# Patient Record
Sex: FEMALE | Race: WHITE | NOT HISPANIC OR LATINO | URBAN - METROPOLITAN AREA
[De-identification: names, ages, dates, MRNs, and addresses within clinical notes are randomized per-mention and may not be internally consistent; named-entity substitution may affect disease eponyms.]

---

## 2018-11-06 ENCOUNTER — CONVERSION ENCOUNTER (OUTPATIENT)
Dept: FAMILY MEDICINE CLINIC | Facility: CLINIC | Age: 63
End: 2018-11-06

## 2018-11-06 ENCOUNTER — OFFICE VISIT CONVERTED (OUTPATIENT)
Dept: FAMILY MEDICINE CLINIC | Facility: CLINIC | Age: 63
End: 2018-11-06
Attending: PHYSICIAN ASSISTANT

## 2019-05-07 ENCOUNTER — OFFICE VISIT CONVERTED (OUTPATIENT)
Dept: FAMILY MEDICINE CLINIC | Facility: CLINIC | Age: 64
End: 2019-05-07
Attending: PHYSICIAN ASSISTANT

## 2019-05-08 ENCOUNTER — HOSPITAL ENCOUNTER (OUTPATIENT)
Dept: LAB | Facility: HOSPITAL | Age: 64
Discharge: HOME | End: 2019-05-08
Attending: PHYSICIAN ASSISTANT

## 2019-05-08 LAB
ALBUMIN SERPL-MCNC: 4.3 G/DL (ref 3.5–5)
ALBUMIN/GLOB SERPL: 1.5 {RATIO} (ref 1.4–2.6)
ALP SERPL-CCNC: 86 U/L (ref 43–160)
ALT SERPL-CCNC: 20 U/L (ref 10–40)
ANION GAP SERPL CALC-SCNC: 21 MMOL/L (ref 8–19)
AST SERPL-CCNC: 19 U/L (ref 15–50)
BILIRUB SERPL-MCNC: 0.36 MG/DL (ref 0.2–1.3)
BUN SERPL-MCNC: 23 MG/DL (ref 5–25)
BUN/CREAT SERPL: 27 {RATIO} (ref 6–20)
CALCIUM SERPL-MCNC: 9.5 MG/DL (ref 8.7–10.4)
CHLORIDE SERPL-SCNC: 104 MMOL/L (ref 99–111)
CHOLEST SERPL-MCNC: 248 MG/DL (ref 107–200)
CHOLEST/HDLC SERPL: 4.3 {RATIO} (ref 3–6)
CONV CO2: 23 MMOL/L (ref 22–32)
CONV TOTAL PROTEIN: 7.2 G/DL (ref 6.3–8.2)
CREAT UR-MCNC: 0.85 MG/DL (ref 0.5–0.9)
GFR SERPLBLD BASED ON 1.73 SQ M-ARVRAT: >60 ML/MIN/{1.73_M2}
GLOBULIN UR ELPH-MCNC: 2.9 G/DL (ref 2–3.5)
GLUCOSE SERPL-MCNC: 116 MG/DL (ref 65–99)
HDLC SERPL-MCNC: 58 MG/DL (ref 40–60)
LDLC SERPL CALC-MCNC: 163 MG/DL (ref 70–100)
OSMOLALITY SERPL CALC.SUM OF ELEC: 301 MOSM/KG (ref 273–304)
POTASSIUM SERPL-SCNC: 4.5 MMOL/L (ref 3.5–5.3)
SODIUM SERPL-SCNC: 143 MMOL/L (ref 135–147)
T4 FREE SERPL-MCNC: 1 NG/DL (ref 0.9–1.8)
TRIGL SERPL-MCNC: 134 MG/DL (ref 40–150)
TSH SERPL-ACNC: 1.47 M[IU]/L (ref 0.27–4.2)
VLDLC SERPL-MCNC: 27 MG/DL (ref 5–37)

## 2019-06-27 ENCOUNTER — OFFICE VISIT CONVERTED (OUTPATIENT)
Dept: FAMILY MEDICINE CLINIC | Facility: CLINIC | Age: 64
End: 2019-06-27
Attending: PHYSICIAN ASSISTANT

## 2019-06-27 ENCOUNTER — CONVERSION ENCOUNTER (OUTPATIENT)
Dept: FAMILY MEDICINE CLINIC | Facility: CLINIC | Age: 64
End: 2019-06-27

## 2019-08-14 ENCOUNTER — HOSPITAL ENCOUNTER (OUTPATIENT)
Dept: GENERAL RADIOLOGY | Facility: HOSPITAL | Age: 64
Discharge: HOME | End: 2019-08-14
Attending: PHYSICIAN ASSISTANT

## 2019-09-24 ENCOUNTER — HOSPITAL ENCOUNTER (OUTPATIENT)
Dept: GENERAL RADIOLOGY | Facility: HOSPITAL | Age: 64
Discharge: HOME | End: 2019-09-24
Attending: PHYSICIAN ASSISTANT

## 2019-10-28 ENCOUNTER — HOSPITAL ENCOUNTER (OUTPATIENT)
Dept: LAB | Facility: HOSPITAL | Age: 64
Discharge: HOME | End: 2019-10-28
Attending: PHYSICIAN ASSISTANT

## 2019-10-28 LAB
25(OH)D3 SERPL-MCNC: 34.9 NG/ML (ref 30–100)
ALBUMIN SERPL-MCNC: 4.3 G/DL (ref 3.5–5)
ALBUMIN/GLOB SERPL: 1.6 {RATIO} (ref 1.4–2.6)
ALP SERPL-CCNC: 76 U/L (ref 43–160)
ALT SERPL-CCNC: 19 U/L (ref 10–40)
ANION GAP SERPL CALC-SCNC: 19 MMOL/L (ref 8–19)
APPEARANCE UR: CLEAR
AST SERPL-CCNC: 21 U/L (ref 15–50)
BASOPHILS # BLD AUTO: 0.05 10*3/UL (ref 0–0.2)
BASOPHILS NFR BLD AUTO: 0.9 % (ref 0–3)
BILIRUB SERPL-MCNC: 0.37 MG/DL (ref 0.2–1.3)
BILIRUB UR QL: NEGATIVE
BUN SERPL-MCNC: 16 MG/DL (ref 5–25)
BUN/CREAT SERPL: 22 {RATIO} (ref 6–20)
CALCIUM SERPL-MCNC: 9.6 MG/DL (ref 8.7–10.4)
CHLORIDE SERPL-SCNC: 103 MMOL/L (ref 99–111)
CHOLEST SERPL-MCNC: 243 MG/DL (ref 107–200)
CHOLEST/HDLC SERPL: 4.9 {RATIO} (ref 3–6)
COLOR UR: YELLOW
CONV ABS IMM GRAN: 0.01 10*3/UL (ref 0–0.2)
CONV CO2: 24 MMOL/L (ref 22–32)
CONV COLLECTION SOURCE (UA): NORMAL
CONV IMMATURE GRAN: 0.2 % (ref 0–1.8)
CONV TOTAL PROTEIN: 7 G/DL (ref 6.3–8.2)
CONV UROBILINOGEN IN URINE BY AUTOMATED TEST STRIP: 1 {EHRLICHU}/DL (ref 0.1–1)
CREAT UR-MCNC: 0.73 MG/DL (ref 0.5–0.9)
DEPRECATED RDW RBC AUTO: 45.1 FL (ref 36.4–46.3)
EOSINOPHIL # BLD AUTO: 0.13 10*3/UL (ref 0–0.7)
EOSINOPHIL # BLD AUTO: 2.4 % (ref 0–7)
ERYTHROCYTE [DISTWIDTH] IN BLOOD BY AUTOMATED COUNT: 13.2 % (ref 11.7–14.4)
EST. AVERAGE GLUCOSE BLD GHB EST-MCNC: 134 MG/DL
GFR SERPLBLD BASED ON 1.73 SQ M-ARVRAT: >60 ML/MIN/{1.73_M2}
GLOBULIN UR ELPH-MCNC: 2.7 G/DL (ref 2–3.5)
GLUCOSE SERPL-MCNC: 107 MG/DL (ref 65–99)
GLUCOSE UR QL: NEGATIVE MG/DL
HBA1C MFR BLD: 6.3 % (ref 3.5–5.7)
HCT VFR BLD AUTO: 38.5 % (ref 37–47)
HDLC SERPL-MCNC: 50 MG/DL (ref 40–60)
HGB BLD-MCNC: 12.3 G/DL (ref 12–16)
HGB UR QL STRIP: NEGATIVE
KETONES UR QL STRIP: NEGATIVE MG/DL
LDLC SERPL CALC-MCNC: 160 MG/DL (ref 70–100)
LEUKOCYTE ESTERASE UR QL STRIP: NEGATIVE
LYMPHOCYTES # BLD AUTO: 2.42 10*3/UL (ref 1–5)
LYMPHOCYTES NFR BLD AUTO: 44.8 % (ref 20–45)
MCH RBC QN AUTO: 29.7 PG (ref 27–31)
MCHC RBC AUTO-ENTMCNC: 31.9 G/DL (ref 33–37)
MCV RBC AUTO: 93 FL (ref 81–99)
MONOCYTES # BLD AUTO: 0.46 10*3/UL (ref 0.2–1.2)
MONOCYTES NFR BLD AUTO: 8.5 % (ref 3–10)
NEUTROPHILS # BLD AUTO: 2.33 10*3/UL (ref 2–8)
NEUTROPHILS NFR BLD AUTO: 43.2 % (ref 30–85)
NITRITE UR QL STRIP: NEGATIVE
NRBC CBCN: 0 % (ref 0–0.7)
OSMOLALITY SERPL CALC.SUM OF ELEC: 296 MOSM/KG (ref 273–304)
PH UR STRIP.AUTO: 5 [PH] (ref 5–8)
PLATELET # BLD AUTO: 256 10*3/UL (ref 130–400)
PMV BLD AUTO: 10 FL (ref 9.4–12.3)
POTASSIUM SERPL-SCNC: 4.3 MMOL/L (ref 3.5–5.3)
PROT UR QL: NEGATIVE MG/DL
RBC # BLD AUTO: 4.14 10*6/UL (ref 4.2–5.4)
SODIUM SERPL-SCNC: 142 MMOL/L (ref 135–147)
SP GR UR: 1.02 (ref 1–1.03)
T4 FREE SERPL-MCNC: 0.9 NG/DL (ref 0.9–1.8)
TRIGL SERPL-MCNC: 164 MG/DL (ref 40–150)
TSH SERPL-ACNC: 1.87 M[IU]/L (ref 0.27–4.2)
VLDLC SERPL-MCNC: 33 MG/DL (ref 5–37)
WBC # BLD AUTO: 5.4 10*3/UL (ref 4.8–10.8)

## 2019-11-11 ENCOUNTER — OFFICE VISIT CONVERTED (OUTPATIENT)
Dept: FAMILY MEDICINE CLINIC | Facility: CLINIC | Age: 64
End: 2019-11-11
Attending: PHYSICIAN ASSISTANT

## 2019-11-11 ENCOUNTER — HOSPITAL ENCOUNTER (OUTPATIENT)
Dept: GENERAL RADIOLOGY | Facility: HOSPITAL | Age: 64
Discharge: HOME | End: 2019-11-11
Attending: PHYSICIAN ASSISTANT

## 2019-12-06 ENCOUNTER — OFFICE VISIT CONVERTED (OUTPATIENT)
Dept: FAMILY MEDICINE CLINIC | Facility: CLINIC | Age: 64
End: 2019-12-06
Attending: PHYSICIAN ASSISTANT

## 2020-04-21 ENCOUNTER — TELEMEDICINE CONVERTED (OUTPATIENT)
Dept: FAMILY MEDICINE CLINIC | Facility: CLINIC | Age: 65
End: 2020-04-21
Attending: PHYSICIAN ASSISTANT

## 2021-05-12 NOTE — PROGRESS NOTES
Progress Note      Patient Name: Maria Esther Crespo   Patient ID: [id number]   Sex: Female   YOB: 1955    Primary Care Provider: Sherman Katz PA-C   Referring Provider: Sherman Katz PA-C    Visit Date: April 21, 2020    Provider: Sherman Katz PA-C   Location: [facility]   Location Address: [address] [place] [address], [address]  [place], [place]  [id number]   Location Phone: [phone]          Chief Complaint  · both feet burning      History Of Present Illness  Video Conferencing Visit  Maria Esther Crespo is a 64 year old /White female who is presenting for evaluation via video conferencing. Verbal consent obtained before beginning visit.   The following staff were present during this visit: Amber Torres CMA/Sherman Katz PA-C   Maria Esther Crespo is a 64 year old /White female who presents for evaluation and treatment of: both feet burning.      pt presents today for both feet burning with some numbness. pt stated her big toe joints are swollen, this has been going on for about a week.  pt stated she has been treated for gout before.    Pt denies any increased etoh consumption but she has been eating more processed food since being at home during the viral outbreak.           Past Medical History  Disease Name Date Onset Notes   Ankle fracture --  --    Cervical spine pain --  --    Cervicalgia 02/28/2017 --    Hepatitis C Screening 08/29/2017 [facility]   Hyperglycemia --  --    Hyperlipidemia 11/06/2018 --    Lumbago 01/31/2017 --    Muscle strain 02/28/2017 --    Pap smear for cervical cancer screening 01/24/2017 Negative   Paresthesia 01/31/2017 feet   Screening Mammogram 04/06/2018 ; 09/27/2019 [facility] - Normal, repeat in a year ; 2019 - normal, repeat in one year   Thoracic back pain 01/31/2017 --          Past Surgical History  Procedure Name Date Notes   Carpal tunnel release --  right   Rotator Cuff repair --  right         Medication List  Name Date Started Instructions   Celebrex 100 mg  oral capsule 2019 take 1 capsule (100 mg) by oral route 2 times per day for 30 days   cyclobenzaprine 10 mg oral tablet 2019 take 1 tablet (10 mg) by oral route 3 times per day PRN spasms/pain   rosuvastatin 40 mg oral tablet 2019 take 1 tablet (40 mg) by oral route once daily         Allergy List  Allergen Name Date Reaction Notes   NO KNOWN DRUG ALLERGIES --  --  --        Allergies Reconciled  Family Medical History  Disease Name Relative/Age Notes   *No Known Family History  --          Reproductive History  Menstrual   Age Menarche: 13   Pregnancy Summary   Total Pregnancies: 0 Full Term: 0 Premature: 0   Ab Induced: 0 Ab Spontaneous: 0 Ectopics: 0   Multiples: 0 Livin         Social History  Finding Status Start/Stop Quantity Notes   Alcohol Never --/-- --  --     --  --/-- --  --    No known infection risk --  --/-- --  --    Tobacco Never --/-- --  --          Immunizations  NameDate Admin Mfg Trade Name Lot Number Route Inj VIS Given VIS Publication   Hepatitis [id number] SKB HAVRIX-ADULT 2AD47 IM LD 2019    Comments:          Review of Systems  · Constitutional  o Denies  o : fever, fatigue, weight loss, weight gain  · Cardiovascular  o Denies  o : lower extremity edema, claudication, chest pressure, palpitations  · Respiratory  o Denies  o : shortness of breath, wheezing, cough, hemoptysis, dyspnea on exertion  · Gastrointestinal  o Denies  o : nausea, vomiting, diarrhea, constipation, abdominal pain      Physical Examination  · Constitutional  o Appearance  o : well-nourished, no acute distress  · Respiratory  o Respiratory Effort  o : breathing comfortably, no cough  · Skin and Subcutaneous Tissue  o General Inspection  o : no visible rash, no lesions  · Neurologic  o Mental Status Examination  o :   § Orientation  § : grossly oriented to person, place and time, no facial  droop          Assessment  · Hyperlipidemia     272.4/E78.5  · Gout     274.9/M10.9      Plan  · Orders  o ACO-39: Current medications updated and reviewed () - - 04/21/2020  · Medications  o indomethacin 50 mg oral capsule   SIG: take 1 capsule (50 mg) by oral route 3 times per day with food   DISP: (21) capsules with 0 refills  Prescribed on 04/21/2020     o Augmentin 875-125 mg oral tablet   SIG: take 1 tablet by oral route every 12 hours for 10 days   DISP: (20) tablets with 0 refills  Discontinued on 04/21/2020     o Effexor XR 37.5 mg oral capsule,extended release 24hr   SIG: take 1 capsule (37.5 mg) by oral route once daily with food for hot flashes   DISP: (90) capsules with 1 refills  Discontinued on 04/21/2020     o Medications have been Reconciled  o Transition of Care or Provider Policy  · Instructions  o Patient was educated and given low cholesterol diet information.  o Recommended exercise program to assist with cholesterol, weight loss and overall health improvement.  o Advised that cheeses and other sources of dairy fats, animal fats, fast food, and the extras (candy, pastries, pies, doughnuts and cookies) all contain LDL raising nutrients. Advised to increase fruits, vegetables, whole grains, and to monitor portion sizes.   o Take all medications as prescribed/directed.  o Patient instructed/educated on their diet and exercise program.  o Patient was educated/instructed on their diagnosis, treatment and medications prior to discharge from the clinic today.  o Low purine diet  · Disposition  o Call or Return if symptoms worsen or persist.  o Care Transition            Electronically Signed by: Sherman Katz PA-C -Author on April 21, 2020 09:16:08 AM

## 2021-05-15 VITALS
WEIGHT: 188.25 LBS | DIASTOLIC BLOOD PRESSURE: 67 MMHG | BODY MASS INDEX: 32.14 KG/M2 | HEART RATE: 50 BPM | HEIGHT: 64 IN | OXYGEN SATURATION: 96 % | SYSTOLIC BLOOD PRESSURE: 122 MMHG

## 2021-05-15 VITALS
SYSTOLIC BLOOD PRESSURE: 118 MMHG | OXYGEN SATURATION: 95 % | TEMPERATURE: 98 F | DIASTOLIC BLOOD PRESSURE: 60 MMHG | HEIGHT: 64 IN | BODY MASS INDEX: 33.63 KG/M2 | HEART RATE: 57 BPM | WEIGHT: 197 LBS

## 2021-05-15 VITALS
HEIGHT: 64 IN | SYSTOLIC BLOOD PRESSURE: 136 MMHG | OXYGEN SATURATION: 99 % | BODY MASS INDEX: 32.78 KG/M2 | HEART RATE: 54 BPM | DIASTOLIC BLOOD PRESSURE: 64 MMHG | WEIGHT: 192 LBS

## 2021-05-15 VITALS
WEIGHT: 193 LBS | HEIGHT: 64 IN | BODY MASS INDEX: 32.95 KG/M2 | HEART RATE: 56 BPM | SYSTOLIC BLOOD PRESSURE: 116 MMHG | DIASTOLIC BLOOD PRESSURE: 62 MMHG | OXYGEN SATURATION: 95 %

## 2021-05-16 VITALS
OXYGEN SATURATION: 94 % | HEIGHT: 65 IN | WEIGHT: 190.12 LBS | BODY MASS INDEX: 31.68 KG/M2 | DIASTOLIC BLOOD PRESSURE: 60 MMHG | HEART RATE: 56 BPM | SYSTOLIC BLOOD PRESSURE: 120 MMHG

## 2023-07-22 ENCOUNTER — APPOINTMENT (OUTPATIENT)
Dept: CT IMAGING | Facility: HOSPITAL | Age: 68
End: 2023-07-22
Payer: MEDICARE

## 2023-07-22 ENCOUNTER — HOSPITAL ENCOUNTER (EMERGENCY)
Facility: HOSPITAL | Age: 68
Discharge: HOME | End: 2023-07-22
Attending: EMERGENCY MEDICINE | Admitting: EMERGENCY MEDICINE
Payer: MEDICARE

## 2023-07-22 VITALS
HEART RATE: 43 BPM | HEIGHT: 65 IN | BODY MASS INDEX: 31.04 KG/M2 | RESPIRATION RATE: 18 BRPM | OXYGEN SATURATION: 97 % | DIASTOLIC BLOOD PRESSURE: 65 MMHG | WEIGHT: 186.29 LBS | SYSTOLIC BLOOD PRESSURE: 124 MMHG | TEMPERATURE: 98.3 F

## 2023-07-22 DIAGNOSIS — S16.1XXA CERVICAL STRAIN, ACUTE, INITIAL ENCOUNTER: Primary | ICD-10-CM

## 2023-07-22 DIAGNOSIS — M54.12 LEFT CERVICAL RADICULOPATHY: ICD-10-CM

## 2023-07-22 LAB
ANION GAP SERPL CALCULATED.3IONS-SCNC: 11.3 MMOL/L (ref 5–15)
BASOPHILS # BLD AUTO: 0.07 10*3/MM3 (ref 0–0.2)
BASOPHILS NFR BLD AUTO: 0.9 % (ref 0–1.5)
BUN SERPL-MCNC: 16 MG/DL (ref 8–23)
BUN/CREAT SERPL: 22.5 (ref 7–25)
CALCIUM SPEC-SCNC: 9.8 MG/DL (ref 8.6–10.5)
CHLORIDE SERPL-SCNC: 99 MMOL/L (ref 98–107)
CO2 SERPL-SCNC: 24.7 MMOL/L (ref 22–29)
CREAT SERPL-MCNC: 0.71 MG/DL (ref 0.57–1)
DEPRECATED RDW RBC AUTO: 40.9 FL (ref 37–54)
EGFRCR SERPLBLD CKD-EPI 2021: 93.9 ML/MIN/1.73
EOSINOPHIL # BLD AUTO: 0.13 10*3/MM3 (ref 0–0.4)
EOSINOPHIL NFR BLD AUTO: 1.6 % (ref 0.3–6.2)
ERYTHROCYTE [DISTWIDTH] IN BLOOD BY AUTOMATED COUNT: 12.8 % (ref 12.3–15.4)
GLUCOSE SERPL-MCNC: 137 MG/DL (ref 65–99)
HCT VFR BLD AUTO: 40.4 % (ref 34–46.6)
HGB BLD-MCNC: 13.8 G/DL (ref 12–15.9)
IMM GRANULOCYTES # BLD AUTO: 0.03 10*3/MM3 (ref 0–0.05)
IMM GRANULOCYTES NFR BLD AUTO: 0.4 % (ref 0–0.5)
LYMPHOCYTES # BLD AUTO: 2.53 10*3/MM3 (ref 0.7–3.1)
LYMPHOCYTES NFR BLD AUTO: 31.1 % (ref 19.6–45.3)
MCH RBC QN AUTO: 29.9 PG (ref 26.6–33)
MCHC RBC AUTO-ENTMCNC: 34.2 G/DL (ref 31.5–35.7)
MCV RBC AUTO: 87.6 FL (ref 79–97)
MONOCYTES # BLD AUTO: 0.63 10*3/MM3 (ref 0.1–0.9)
MONOCYTES NFR BLD AUTO: 7.7 % (ref 5–12)
NEUTROPHILS NFR BLD AUTO: 4.75 10*3/MM3 (ref 1.7–7)
NEUTROPHILS NFR BLD AUTO: 58.3 % (ref 42.7–76)
NRBC BLD AUTO-RTO: 0 /100 WBC (ref 0–0.2)
PLATELET # BLD AUTO: 299 10*3/MM3 (ref 140–450)
PMV BLD AUTO: 9.5 FL (ref 6–12)
POTASSIUM SERPL-SCNC: 4.2 MMOL/L (ref 3.5–5.2)
RBC # BLD AUTO: 4.61 10*6/MM3 (ref 3.77–5.28)
SODIUM SERPL-SCNC: 135 MMOL/L (ref 136–145)
WBC NRBC COR # BLD: 8.14 10*3/MM3 (ref 3.4–10.8)

## 2023-07-22 PROCEDURE — 25010000002 ONDANSETRON PER 1 MG: Performed by: EMERGENCY MEDICINE

## 2023-07-22 PROCEDURE — 80048 BASIC METABOLIC PNL TOTAL CA: CPT | Performed by: EMERGENCY MEDICINE

## 2023-07-22 PROCEDURE — 25010000002 KETOROLAC TROMETHAMINE PER 15 MG: Performed by: EMERGENCY MEDICINE

## 2023-07-22 PROCEDURE — 96374 THER/PROPH/DIAG INJ IV PUSH: CPT

## 2023-07-22 PROCEDURE — 96375 TX/PRO/DX INJ NEW DRUG ADDON: CPT

## 2023-07-22 PROCEDURE — 96376 TX/PRO/DX INJ SAME DRUG ADON: CPT

## 2023-07-22 PROCEDURE — 70498 CT ANGIOGRAPHY NECK: CPT

## 2023-07-22 PROCEDURE — 85025 COMPLETE CBC W/AUTO DIFF WBC: CPT | Performed by: EMERGENCY MEDICINE

## 2023-07-22 PROCEDURE — 25010000002 HYDROMORPHONE 1 MG/ML SOLUTION: Performed by: EMERGENCY MEDICINE

## 2023-07-22 PROCEDURE — 99284 EMERGENCY DEPT VISIT MOD MDM: CPT

## 2023-07-22 PROCEDURE — 25510000001 IOPAMIDOL PER 1 ML: Performed by: EMERGENCY MEDICINE

## 2023-07-22 RX ORDER — ONDANSETRON 2 MG/ML
4 INJECTION INTRAMUSCULAR; INTRAVENOUS ONCE
Status: DISCONTINUED | OUTPATIENT
Start: 2023-07-22 | End: 2023-07-22

## 2023-07-22 RX ORDER — METHOCARBAMOL 500 MG/1
500 TABLET, FILM COATED ORAL 4 TIMES DAILY PRN
Qty: 15 TABLET | Refills: 0 | Status: SHIPPED | OUTPATIENT
Start: 2023-07-22

## 2023-07-22 RX ORDER — IBUPROFEN 800 MG/1
800 TABLET ORAL EVERY 8 HOURS PRN
Qty: 20 TABLET | Refills: 0 | Status: SHIPPED | OUTPATIENT
Start: 2023-07-22

## 2023-07-22 RX ORDER — KETOROLAC TROMETHAMINE 15 MG/ML
15 INJECTION, SOLUTION INTRAMUSCULAR; INTRAVENOUS ONCE
Status: COMPLETED | OUTPATIENT
Start: 2023-07-22 | End: 2023-07-22

## 2023-07-22 RX ORDER — ONDANSETRON 2 MG/ML
4 INJECTION INTRAMUSCULAR; INTRAVENOUS ONCE
Status: COMPLETED | OUTPATIENT
Start: 2023-07-22 | End: 2023-07-22

## 2023-07-22 RX ORDER — HYDROCODONE BITARTRATE AND ACETAMINOPHEN 5; 325 MG/1; MG/1
1 TABLET ORAL ONCE
Status: DISCONTINUED | OUTPATIENT
Start: 2023-07-22 | End: 2023-07-22

## 2023-07-22 RX ORDER — HYDROCODONE BITARTRATE AND ACETAMINOPHEN 5; 325 MG/1; MG/1
1 TABLET ORAL EVERY 6 HOURS PRN
Status: DISCONTINUED | OUTPATIENT
Start: 2023-07-22 | End: 2023-07-22 | Stop reason: HOSPADM

## 2023-07-22 RX ORDER — HYDROCODONE BITARTRATE AND ACETAMINOPHEN 5; 325 MG/1; MG/1
1 TABLET ORAL EVERY 6 HOURS PRN
Qty: 12 TABLET | Refills: 0 | Status: SHIPPED | OUTPATIENT
Start: 2023-07-22

## 2023-07-22 RX ORDER — ONDANSETRON 4 MG/1
4 TABLET, ORALLY DISINTEGRATING ORAL EVERY 6 HOURS PRN
Qty: 10 TABLET | Refills: 0 | Status: SHIPPED | OUTPATIENT
Start: 2023-07-22

## 2023-07-22 RX ORDER — SODIUM CHLORIDE 0.9 % (FLUSH) 0.9 %
10 SYRINGE (ML) INJECTION AS NEEDED
Status: DISCONTINUED | OUTPATIENT
Start: 2023-07-22 | End: 2023-07-22 | Stop reason: HOSPADM

## 2023-07-22 RX ADMIN — KETOROLAC TROMETHAMINE 15 MG: 15 INJECTION, SOLUTION INTRAMUSCULAR; INTRAVENOUS at 09:16

## 2023-07-22 RX ADMIN — HYDROMORPHONE HYDROCHLORIDE 1 MG: 1 INJECTION, SOLUTION INTRAMUSCULAR; INTRAVENOUS; SUBCUTANEOUS at 09:16

## 2023-07-22 RX ADMIN — IOPAMIDOL 100 ML: 755 INJECTION, SOLUTION INTRAVENOUS at 11:26

## 2023-07-22 RX ADMIN — ONDANSETRON 4 MG: 2 INJECTION INTRAMUSCULAR; INTRAVENOUS at 09:16

## 2023-07-22 RX ADMIN — HYDROCODONE BITARTRATE AND ACETAMINOPHEN 1 TABLET: 5; 325 TABLET ORAL at 13:27

## 2023-07-22 RX ADMIN — ONDANSETRON 4 MG: 2 INJECTION INTRAMUSCULAR; INTRAVENOUS at 13:27

## 2023-07-22 NOTE — ED PROVIDER NOTES
"Time: 9:13 AM EDT  Date of encounter:  2023  Independent Historian/Clinical History and Information was obtained by:   Patient    History is limited by: N/A    Chief Complaint: Severe posterior neck pain after chiropractic adjustment        History of Present Illness:  Patient is a 67 y.o. year old female who presents to the emergency department for evaluation of severe posterior neck pain all week after chiropractic adjustment.    She saw the chiropractor last week for some tingling discomfort going down her left arm and they adjusted her neck, and she afterwards had severe neck pain.    She went back the next day in the adjusted her again.    She has been having severe posterior neck pain a bit more on the left posterior aspect of the neck, somewhat worse with movement.    She is not really complaining of much pain down the left arm.      No fevers reported.  She has not taken any pain medication today.    HPI    Patient Care Team  Primary Care Provider: Provider, No Known    Past Medical History:     No Known Allergies  History reviewed. No pertinent past medical history.  Past Surgical History:   Procedure Laterality Date    BILATERAL BREAST REDUCTION Bilateral      SECTION       History reviewed. No pertinent family history.    Home Medications:  Prior to Admission medications    Not on File        Social History:   Social History     Tobacco Use    Smoking status: Never   Substance Use Topics    Alcohol use: Never    Drug use: Never         Review of Systems:  Review of Systems   I performed a 10 point review of systems which was all negative, except for the positives found in the HPI above.  Physical Exam:  /65   Pulse (!) 43   Temp 98.3 °F (36.8 °C) (Oral)   Resp 18   Ht 165.1 cm (65\")   Wt 84.5 kg (186 lb 4.6 oz)   SpO2 97%   BMI 31.00 kg/m²     Physical Exam   General: Awake alert and in moderate distress due to neck pain    HEENT: Head normocephalic atraumatic, eyes PERRLA EOMI, " nose normal, oropharynx normal.    Neck: Somewhat decreased range of motion from side to side, no meningismus, no lymphadenopathy summary: No reproducible C-spine tenderness to palpation    Heart: Regular rate and rhythm, no murmurs or rubs, 2+ radial pulses bilaterally    Lungs: Clear to auscultation bilaterally without wheezes or crackles, no respiratory distress    Abdomen: Soft, nontender, nondistended, no rebound or guarding    Skin: Warm, dry, no rash    Musculoskeletal: Normal range of motion, no lower extremity edema    Neurologic: Oriented x3, no motor deficits no sensory deficits    Psychiatric: Mood appears stable, no psychosis          Procedures:  Procedures      Medical Decision Making:      Comorbidities that affect care:    None    External Notes reviewed:    None      The following orders were placed and all results were independently analyzed by me:  Orders Placed This Encounter   Procedures    CT Angiogram Neck    Basic Metabolic Panel    CBC Auto Differential    Pulse Oximetry, Continuous    Insert Peripheral IV    CBC & Differential       Medications Given in the Emergency Department:  Medications   sodium chloride 0.9 % flush 10 mL (has no administration in time range)   HYDROcodone-acetaminophen (NORCO) 5-325 MG per tablet 1 tablet (1 tablet Oral Given 7/22/23 1327)   ketorolac (TORADOL) injection 15 mg (15 mg Intravenous Given 7/22/23 0916)   HYDROmorphone (DILAUDID) injection 1 mg (1 mg Intravenous Given 7/22/23 0916)   ondansetron (ZOFRAN) injection 4 mg (4 mg Intravenous Given 7/22/23 0916)   iopamidol (ISOVUE-370) 76 % injection 100 mL (100 mL Intravenous Given 7/22/23 1126)   ondansetron (ZOFRAN) injection 4 mg (4 mg Intravenous Given 7/22/23 1327)        ED Course:         Labs:    Lab Results (last 24 hours)       Procedure Component Value Units Date/Time    CBC & Differential [[id number]]  (Normal) Collected: 07/22/23 0915    Specimen: Blood Updated: 07/22/23 0932    Narrative:       The following orders were created for panel order CBC & Differential.  Procedure                               Abnormality         Status                     ---------                               -----------         ------                     CBC Auto Differential[[id number]]        Normal              Final result                 Please view results for these tests on the individual orders.    Basic Metabolic Panel [[id number]]  (Abnormal) Collected: 07/22/23 0915    Specimen: Blood Updated: 07/22/23 1021     Glucose 137 mg/dL      BUN 16 mg/dL      Creatinine 0.71 mg/dL      Sodium 135 mmol/L      Potassium 4.2 mmol/L      Chloride 99 mmol/L      CO2 24.7 mmol/L      Calcium 9.8 mg/dL      BUN/Creatinine Ratio 22.5     Anion Gap 11.3 mmol/L      eGFR 93.9 mL/min/1.73     Narrative:      GFR Normal >60  Chronic Kidney Disease <60  Kidney Failure <15      CBC Auto Differential [[id number]]  (Normal) Collected: 07/22/23 0915    Specimen: Blood Updated: 07/22/23 0932     WBC 8.14 10*3/mm3      RBC 4.61 10*6/mm3      Hemoglobin 13.8 g/dL      Hematocrit 40.4 %      MCV 87.6 fL      MCH 29.9 pg      MCHC 34.2 g/dL      RDW 12.8 %      RDW-SD 40.9 fl      MPV 9.5 fL      Platelets 299 10*3/mm3      Neutrophil % 58.3 %      Lymphocyte % 31.1 %      Monocyte % 7.7 %      Eosinophil % 1.6 %      Basophil % 0.9 %      Immature Grans % 0.4 %      Neutrophils, Absolute 4.75 10*3/mm3      Lymphocytes, Absolute 2.53 10*3/mm3      Monocytes, Absolute 0.63 10*3/mm3      Eosinophils, Absolute 0.13 10*3/mm3      Basophils, Absolute 0.07 10*3/mm3      Immature Grans, Absolute 0.03 10*3/mm3      nRBC 0.0 /100 WBC              Imaging:    CT Angiogram Neck    Result Date: 7/22/2023  PROCEDURE: CT ANGIOGRAM NECK  COMPARISON: None  INDICATIONS: Severe posterior neck pain all week ever since chiropractic adjustment  PROTOCOL:   Standard imaging protocol performed    RADIATION:   DLP: 636.6 mGy*cm   Automated exposure control was utilized  to minimize radiation dose. CONTRAST: 75 cc Isovue 370 I.V. LABS:   eGFR: >60 ml/min/1.73m2  TECHNIQUE: After obtaining the patient's consent, CT images of the neck were obtained without and with non-ionic intravenous contrast material. Multi-planar reformatted/3-D images were created to optimize visualization of vascular anatomy. Unless otherwise stated in this report, all vascular stenoses involving the internal carotid arteries reported for this examination are derived by dividing the lesion diameter by the diameter of the normal internal carotid artery more distally.  FINDINGS:  Vascular:  Visualized pulmonary arteries appear to enhance as expected on this 6 them ago arterial phase exam.  No acute abnormality is seen in the aortic arch.  There is motion artifact in the ascending aorta.  There is common origin of the left common carotid and right brachiocephalic arteries which is a normal variant.  Left subclavian artery appears patent.  There is some artifact at the origin of the right subclavian artery limiting assessment.  No definite high-grade brachiocephalic or subclavian stenosis is seen.  No definite high-grade stenosis is seen in the vertebral arteries.  The vertebral arteries appear to be grossly symmetric and patent.  No definite acute abnormality is seen on this exam.  The left common carotid artery appears patent.  There is mild atherosclerosis at the left carotid bulb.  No stenosis of greater than 50% is seen in the left internal carotid artery by NASCET criteria.  The external carotid artery appears patent.  The right common carotid artery origin is obscured by artifact.  Distal right common carotid artery appears patent.  External carotid artery appears patent.  There is minimal atherosclerosis at the right carotid bulb.  No definite stenosis of greater than 50% is seen by NASCET criteria.  Mild atherosclerosis is seen in the cavernous portions of the bilateral internal carotid arteries.  No  high-grade stenosis is seen.  No definite acute intracranial vascular abnormality is seen.  No other acute vascular abnormality is identified.  Nonvascular:  No definite suspicious infiltrate is seen in the visualized lung apices.  No definite acute abnormality is seen in the visualized intracranial structures.  Globes and orbits appear unremarkable.  Paranasal sinuses and mastoid air cells appear clear.  No definite acute cervical soft tissue abnormality is identified.  Thyroid gland appears grossly unremarkable.  Prevertebral soft tissue contour appears within normal limits.  No definite paraspinal soft tissue abnormality is seen.  There is mild kyphotic curvature of the cervical and upper thoracic spine.  No definite focal malalignment is seen in the visualized spine.  Vertebral body heights appear maintained.  No definite acute displaced fracture is seen.  There is suspected mild multilevel facet arthropathy.  There is multilevel disc degeneration with mild disc height loss in the lower cervical spine.  Disc osteophyte at C5-6 appears to cause mild-to-moderate canal and foraminal narrowing.  There is suspicion for a left paracentral/foraminal protrusion/extrusion at C4-5 which appears to cause mild-to-moderate left canal and possibly severe left foraminal narrowing.  There is also suspicion for left foraminal narrowing at C3-4 due to facet arthropathy and probable disc bulge/protrusion.        1. No definite findings of acute vascular abnormality. 2. Mild carotid artery atherosclerosis without definite stenosis of greater than 50% in the internal carotid arteries at this time. 3. No definite acute osseous cervical spine abnormality is identified. 4. There is suspicion for a left paracentral/foraminal protrusion/extrusion at C4-5 which appears to cause mild to moderate left canal narrowing and possibly severe left foraminal narrowing.  There is also suspicion for left foraminal narrowing at C3-4 and mild to  moderate canal and foraminal narrowing at C5-6.     RYAN MARIE MD       Electronically Signed and Approved By: RYAN MARIE MD on 7/22/2023 at 12:06                Differential Diagnosis and Discussion:    Neck Pain: The patient presents with neck pain. My differential diagnosis includes but is not limited to acute spinal epidural abscess, acute spinal epidural bleed, meningitis, musculoskeletal neck pain, spinal fracture, and osteoarthritis.     CT scan radiology impression was interpreted by me.    MDM     Amount and/or Complexity of Data Reviewed  Clinical lab tests: reviewed  Tests in the radiology section of CPT®: reviewed               This patient is a pleasant 67-year-old female that was having symptoms of left-sided cervical radiculopathy and saw her chiropractor and had her neck adjusted and afterwards had severe posterior neck pain all week.    She is still having severe pain and I am giving her some IV Dilaudid and Toradol for symptom relief and will reassess.    I am getting some imaging of her cervical spine to include imaging of the vertebral arteries given my concern for potential dissection, and we will get CTA of the neck with contrast to rule this out.    Otherwise if CTA of the neck come back negative, she would be safely discharged home to follow-up with PCP and I will prescribe some pain medications to alternate with muscle relaxant.    CTA came back negative for any vascular dissection, but it was noted that she has some neuroforaminal narrowing and left sided cervical radiculopathy and impingement probably causing her symptoms.    I am starting her on high-dose ibuprofen and some pain meds and muscle relaxants until she can follow-up with the spine clinic.            Patient Care Considerations:          Consultants/Shared Management Plan:        Social Determinants of Health:    Patient is independent, reliable, and has access to care.       Disposition and Care  Coordination:    Discharged: I considered escalation of care by admitting this patient for observation, however the patient has improved and is suitable and  stable for discharge.    I have explained the patient´s condition, diagnoses and treatment plan based on the information available to me at this time. I have answered questions and addressed any concerns. The patient has a good  understanding of the patient´s diagnosis, condition, and treatment plan as can be expected at this point. The vital signs have been stable. The patient´s condition is stable and appropriate for discharge from the emergency department.      The patient will pursue further outpatient evaluation with the primary care physician or other designated or consulting physician as outlined in the discharge instructions. They are agreeable to this plan of care and follow-up instructions have been explained in detail. The patient has received these instructions in written format and have expressed an understanding of the discharge instructions. The patient is aware that any significant change in condition or worsening of symptoms should prompt an immediate return to this or the closest emergency department or call to 911.  I have explained discharge medications and the need for follow up with the patient/caretakers. This was also printed in the discharge instructions. Patient was discharged with the following medications and follow up:      Medication List        New Prescriptions      HYDROcodone-acetaminophen 5-325 MG per tablet  Commonly known as: NORCO  Take 1 tablet by mouth Every 6 (Six) Hours As Needed for Severe Pain.     ibuprofen 800 MG tablet  Commonly known as: ADVIL,MOTRIN  Take 1 tablet by mouth Every 8 (Eight) Hours As Needed for Moderate Pain.     methocarbamol 500 MG tablet  Commonly known as: ROBAXIN  Take 1 tablet by mouth 4 (Four) Times a Day As Needed for Muscle Spasms.     ondansetron ODT 4 MG disintegrating tablet  Commonly  known as: ZOFRAN-ODT  Place 1 tablet on the tongue Every 6 (Six) Hours As Needed for Nausea or Vomiting.               Where to Get Your Medications        These medications were sent to [id number] - [place], [place] - [address] AT [facility] [phone]  - [phone]  [address] [place] [address]      Phone: [phone]   HYDROcodone-acetaminophen 5-325 MG per tablet  ibuprofen 800 MG tablet  methocarbamol 500 MG tablet  ondansetron ODT 4 MG disintegrating tablet      Aron Farooq MD  [address] [place] [id number] [phone]    Call in 1 day  for a follow-up appointment       Final diagnoses:   Cervical strain, acute, initial encounter   Left cervical radiculopathy        ED Disposition       ED Disposition   Discharge    Condition   Stable    Comment   --               This medical record created using voice recognition software.             Erlin Richards MD  07/22/23 4910

## 2023-07-22 NOTE — DISCHARGE INSTRUCTIONS
No vascular dissection or injury was seen from your chiropractic adjustment, but more of just pinched nerves seen.      It looks like you have some bulging disks and neuroforaminal narrowing of the cervical spine causing some of the left-sided nerves to get pinched that run down the left shoulder and left arm to the hand.    You can take some anti-inflammatories for pain and you can alternate the pain medicine and with muscle relaxant as needed.    You should call the spine surgery clinic for a follow-up appointment to see if anything else needs to be done.

## 2023-07-22 NOTE — Clinical Note
[facility] EMERGENCY ROOM  [address] [place] [address]  Phone: [phone]    Maria Esther Crespo was seen and treated in our emergency department on 7/22/2023.  She may return to work on 07/24/2023.         Thank you for choosing [facility].    Erlin Richards MD

## 2023-07-27 ENCOUNTER — OFFICE VISIT (OUTPATIENT)
Dept: NEUROSURGERY | Facility: CLINIC | Age: 68
End: 2023-07-27
Payer: MEDICARE

## 2023-07-27 VITALS
SYSTOLIC BLOOD PRESSURE: 122 MMHG | WEIGHT: 185 LBS | BODY MASS INDEX: 30.82 KG/M2 | HEIGHT: 65 IN | DIASTOLIC BLOOD PRESSURE: 72 MMHG | HEART RATE: 56 BPM

## 2023-07-27 DIAGNOSIS — M48.02 FORAMINAL STENOSIS OF CERVICAL REGION: ICD-10-CM

## 2023-07-27 DIAGNOSIS — M54.12 RIGHT CERVICAL RADICULOPATHY: ICD-10-CM

## 2023-07-27 DIAGNOSIS — M50.30 DDD (DEGENERATIVE DISC DISEASE), CERVICAL: Primary | ICD-10-CM

## 2023-07-27 DIAGNOSIS — M54.2 CERVICALGIA: ICD-10-CM

## 2023-07-27 RX ORDER — GABAPENTIN 300 MG/1
300 CAPSULE ORAL 3 TIMES DAILY
Qty: 90 CAPSULE | Refills: 1 | Status: SHIPPED | OUTPATIENT
Start: 2023-07-27

## 2023-07-27 NOTE — PROGRESS NOTES
"Chief Complaint  Neck Pain, Arm Pain (Right arm to fingers), and Tingling (Right forearm to 2nd-4th digits of right hand )    Subjective          Maria Esther Crespo who is a 67 y.o. year old female who presents to [facility] for Evaluation of the Spine.     The patient complains of pain located in the Cervical Spine.  Patients states the pain has been present for 1 week.  The pain came on acutely.  The pain scaled level is 10.  The pain does radiate. Dermatomes are located on left Cervical at: the middle 3 fingers.  The pain is constant and waxing/waning and described as dull.  The pain is worse at no particular time of day. Patient states Pain Medication makes the pain better.  Patient states  any manual activity makes the pain worse.    Associated Symptoms Include: Numbness and Tingling in the left arm. She reports subjective weakness in the left arm.  Conservative Interventions Include: Pain Medications that were somewhat effective., NSAIDs that were ineffective., and Muscle Relaxants that were ineffective. Chiropractor which was ineffective.    Was this the result of an injury or accident? : No    History of Previous Spinal Surgery?: No     reports that she has never smoked. She does not have any smokeless tobacco history on file.    Review of Systems   Musculoskeletal:  Positive for neck pain.      Objective   Vital Signs:   /72   Pulse 56   Ht 165.1 cm (65\")   Wt 83.9 kg (185 lb)   BMI 30.79 kg/m²       Physical Exam  Constitutional:       Appearance: Normal appearance. She is obese.   Neck:      Comments: Pain with ROM  Pulmonary:      Effort: Pulmonary effort is normal.   Musculoskeletal:         General: No tenderness.      Comments: Wright's negative bilaterally, Tinel's testing negative at left wrist and elbow   Neurological:      General: No focal deficit present.      Mental Status: She is alert and oriented to person, place, and time.      Sensory: No " sensory deficit.      Motor: Weakness (left elbow abduction) present.      Deep Tendon Reflexes: Reflexes normal.   Psychiatric:         Mood and Affect: Mood normal.         Behavior: Behavior normal.      Neurologic Exam     Mental Status   Oriented to person, place, and time.      Result Review     I personally reviewed the CT angiogram of neck from 7/22/2023 which shows suspicion for left paracentric foraminal disc protrusion or extrusion at C4-C5 with mild to moderate left canal narrowing and possibly severe left foraminal stenosis.  There is also suspicion for left foraminal narrowing at C3-C4 and mild to moderate central canal and foraminal stenosis at C5-C6.     Assessment and Plan    Diagnoses and all orders for this visit:    1. DDD (degenerative disc disease), cervical (Primary)  -     MRI Cervical Spine Without Contrast; Future    2. Foraminal stenosis of cervical region  -     MRI Cervical Spine Without Contrast; Future    3. Cervicalgia  -     MRI Cervical Spine Without Contrast; Future    4. Right cervical radiculopathy  -     MRI Cervical Spine Without Contrast; Future  -     gabapentin (NEURONTIN) 300 MG capsule; Take 1 capsule by mouth 3 (Three) Times a Day. Start with 1 capsule by mouth before bedtime for 2-3 days, then 1 capsule twice daily for 2-3 days, then 1 capsule 3 times a day.  Dispense: 90 capsule; Refill: 1    She has pain in the neck and down the left arm the middle three fingers especially.    She has some degenerative changes in the cervical spine on the CT angiogram of neck from 7/22/23, this is worse at C4-C5 level with possibly severe left foraminal stenosis.    Overall, the CT does not well evaluate the stenosis in the cervical spine, and I am going to order an MRI of the cervical spine without contrast to evaluate the spinal pathology further.    I expect she could consider PT vs CESB trial. She is deferring for now in favor of more imaging to evaluate further.    I expect she  could benefit from neuropathic treatment, as Norco is not very effective. I am going to start her on Gabapentin 300 mg capsules with target dose of TID. She will titrate up. We discussed the titration process and she verbalized understanding. I will monitor her CHLOE prior to sending this prescription.    She will follow-up here in 4 weeks to reassess and review imaging, sooner if needed.    Follow Up   Return in about 4 weeks (around 8/24/2023).  Patient was given instructions and counseling regarding her condition or for health maintenance advice. Please see specific information pulled into the AVS if appropriate.

## 2023-08-29 ENCOUNTER — HOSPITAL ENCOUNTER (OUTPATIENT)
Dept: MRI IMAGING | Facility: HOSPITAL | Age: 68
Discharge: HOME | End: 2023-08-29
Admitting: PHYSICIAN ASSISTANT
Payer: MEDICARE

## 2023-08-29 DIAGNOSIS — M54.2 CERVICALGIA: ICD-10-CM

## 2023-08-29 DIAGNOSIS — M48.02 FORAMINAL STENOSIS OF CERVICAL REGION: ICD-10-CM

## 2023-08-29 DIAGNOSIS — M50.30 DDD (DEGENERATIVE DISC DISEASE), CERVICAL: ICD-10-CM

## 2023-08-29 DIAGNOSIS — M54.12 RIGHT CERVICAL RADICULOPATHY: ICD-10-CM

## 2023-08-29 PROCEDURE — 72141 MRI NECK SPINE W/O DYE: CPT

## 2023-08-31 ENCOUNTER — OFFICE VISIT (OUTPATIENT)
Dept: NEUROSURGERY | Facility: CLINIC | Age: 68
End: 2023-08-31
Payer: MEDICARE

## 2023-08-31 VITALS
HEART RATE: 50 BPM | HEIGHT: 65 IN | SYSTOLIC BLOOD PRESSURE: 136 MMHG | WEIGHT: 191 LBS | DIASTOLIC BLOOD PRESSURE: 65 MMHG | BODY MASS INDEX: 31.82 KG/M2

## 2023-08-31 DIAGNOSIS — M50.221 HERNIATED NUCLEUS PULPOSUS, C4-5 LEFT: Primary | ICD-10-CM

## 2023-08-31 DIAGNOSIS — M54.2 CERVICALGIA: ICD-10-CM

## 2023-08-31 DIAGNOSIS — M54.12 RIGHT CERVICAL RADICULOPATHY: ICD-10-CM

## 2023-08-31 DIAGNOSIS — M48.02 FORAMINAL STENOSIS OF CERVICAL REGION: ICD-10-CM

## 2023-08-31 NOTE — PROGRESS NOTES
Patient being seen for today for Follow-up  .    Subjective    Maria Esther Crespo is a 67 y.o. female that presents with Follow-up  .    HPI  Previously: Last seen on 7/27/2023 for complaints of pain in the neck and down the left arm to the middle 3 fingers especially.  She did have a CT angiogram of the neck showing some degenerative changes in the cervical spine worse at C4-C5 with possibly severe left foraminal stenosis.  She deferred physical therapy or cervical epidural steroid block trial.  She was started on gabapentin 300 mg with target dose of 3 times daily to assess benefit for neuropathic pain.  There was an order for MRI of the cervical spine without contrast to evaluate the cervical spine pathology further with plan to follow-up in 4 weeks to reassess and review the imaging.    Today: She reports new pain in the right shoulder. The left arm pain continues to bother her to the middle finger.    She denies any other new complaints.     reports that she has never smoked. She does not have any smokeless tobacco history on file.    Review of Systems   Musculoskeletal:  Positive for arthralgias (right shoulder) and neck pain.   Neurological:  Positive for weakness and numbness.     Objective   Vitals:    08/31/23 1045   BP: 136/65   Pulse: 50        Physical Exam  Constitutional:       Appearance: Normal appearance.   Pulmonary:      Effort: Pulmonary effort is normal.   Musculoskeletal:         General: Tenderness (midline cervical spine, right cervical area) present.      Cervical back: Normal range of motion.      Comments: Wright's negative bilaterally,  Tinel's testing positive at the left wrist   Neurological:      General: No focal deficit present.      Mental Status: She is alert and oriented to person, place, and time.      Sensory: No sensory deficit.      Motor: No weakness.      Deep Tendon Reflexes: Reflexes normal.   Psychiatric:         Mood and Affect: Mood normal.         Behavior: Behavior  normal.        Result Review   I have personally reviewed the MRI of cervical spine without contrast from 8/29/2023 which shows multilevel degenerative disc disease and facet arthritis with a left paracentric disc extrusion at the C4-C5 space with a possible extruded disc fragment causing mass effect on the left C5 nerve root.  There is also moderate to severe central canal stenosis at the C5-C6 level without significant foraminal narrowing. There is no convincing spinal cord compression or spinal cord signal change.     Assessment and Plan {CC Problem List  Visit Diagnosis  ROS  Review (Popup)  [employer]  Quality  BestPractice  Medications  SmartSets  SnapShot Encounters  Media :23}   Diagnoses and all orders for this visit:    1. Herniated nucleus pulposus, C4-5 left (Primary)    2. Foraminal stenosis of cervical region    3. Cervicalgia    4. Right cervical radiculopathy    She has pain in the neck and the left arm mostly to the middle finger.    She does have a left disc extrusion at C4-C5 which likely affects the exiting C5 nerve root. She may consider surgery to address this change, but realistically I expect it would help only pain in the left arm as far down as the elbow. It is unlikely to help pain further down the left arm or neck pain.    She does have positive tinel's at the left wrist, which may indicate underlying median nerve neuropathy or carpal tunnel syndrome, and I would consider nerve testing to evaluate for median nerve involvement at the left wrist. This may be a better explanation for left hand and finger complaints.    She may consider a trial of cervical epidural steroid injections in the neck to assess benefit for the arm pain in particular. She can discuss this further with pain management, and I would be happy to place a referral order to pain management for this if needed.    She may consider physical therapy as a conservative treatment.    She is going to take some  time to think about the options, and notify us if she would like to pursue further testing or treatment.    The patient was counseled on basic recommendations for the reduction and prevention of back, neck, or spine pain in association with spinal disorders, including: cessation/avoidance of nicotine use, maintenance of a healthy BMI and weight, focusing on building/maintaining core strength through core exercise, and avoidance of activities which worsen the pain. The patient will monitor for changes in symptoms and notify our clinic of these changes as needed.    She will follow-up here PRN.  Follow Up {Instructions Charge Capture  Follow-up Communications :23}   Return if symptoms worsen or fail to improve.